# Patient Record
Sex: FEMALE | Race: WHITE | Employment: OTHER | URBAN - METROPOLITAN AREA
[De-identification: names, ages, dates, MRNs, and addresses within clinical notes are randomized per-mention and may not be internally consistent; named-entity substitution may affect disease eponyms.]

---

## 2020-01-27 ENCOUNTER — OFFICE VISIT (OUTPATIENT)
Dept: SURGERY | Age: 55
End: 2020-01-27

## 2020-01-27 VITALS
HEART RATE: 76 BPM | DIASTOLIC BLOOD PRESSURE: 63 MMHG | SYSTOLIC BLOOD PRESSURE: 91 MMHG | WEIGHT: 139 LBS | BODY MASS INDEX: 21.82 KG/M2 | HEIGHT: 67 IN

## 2020-01-27 DIAGNOSIS — T85.44XA CAPSULAR CONTRACTURE OF BREAST IMPLANT, INITIAL ENCOUNTER: Primary | ICD-10-CM

## 2020-01-27 RX ORDER — AMOXICILLIN 500 MG/1
TABLET, FILM COATED ORAL
COMMUNITY
End: 2020-03-06

## 2020-01-27 RX ORDER — CYCLOBENZAPRINE HCL 10 MG
TABLET ORAL
COMMUNITY

## 2020-01-27 RX ORDER — GABAPENTIN 100 MG/1
200 CAPSULE ORAL DAILY
COMMUNITY

## 2020-01-27 RX ORDER — FAMOTIDINE 20 MG/1
20 TABLET, FILM COATED ORAL 2 TIMES DAILY
COMMUNITY

## 2020-01-27 NOTE — PROGRESS NOTES
HISTORY OF PRESENT ILLNESS Roxanne Shay is a 47 y.o. female. HPI    NEW patient presents for consultation at the request of Puja Marin NP for possible RIGHT implant rupture. She says that she had her breast implants (silicone) placed in 4675, but ever since her mammogram about 15 year ago has had what she thinks is a leaking implant on the RIGHT. Says that this is painful and feels hard. Feels no lumps, has no nipple discharge/retraction or skin change. Also ha some pain on the LEFT off/on. Has chronic pain that she deals with daily. FH - Paternal aunt had breast cancer at age 61 and is . A paternal cousin had breast cancer at age 36 and is a survivor. Most recent mammogram was about 15 years ago. Review of Systems Constitutional: Negative. HENT: Positive for congestion. Eyes: Negative. Respiratory: Negative. Cardiovascular: Negative. Gastrointestinal: Positive for heartburn. Genitourinary: Negative. Musculoskeletal: Positive for back pain, joint pain and neck pain. Skin: Positive for rash. Neurological: Negative. Endo/Heme/Allergies: Negative. Psychiatric/Behavioral: Positive for depression. Physical Exam 
 
ASSESSMENT and PLAN 
{ASSESSMENT/PLAN:51608}

## 2020-01-27 NOTE — COMMUNICATION BODY
HISTORY OF PRESENT ILLNESS  Ying Mcgarry is a 47 y.o. female. HPI  NEW patient presents for consultation at the request of Raciel Topete NP for possible RIGHT implant rupture. She says that she had her breast implants (silicone) placed in , but ever since her mammogram about 15 year ago has had what she thinks is a leaking implant on the RIGHT. Says that this is painful and feels hard. Feels no lumps, has no nipple discharge/retraction or skin change. Also has some pain on the LEFT off/on. Has chronic pain that she deals with daily. FH - Paternal aunt had breast cancer at age 61 and is . A paternal cousin had breast cancer at age 36 and is a survivor.       Most recent mammogram was about 15 years ago.         Past Medical History:   Diagnosis Date    Alcohol abuse     Chronic pain     fibromyalgia    DJD (degenerative joint disease)     Fibromyalgia     Mitral prolapse     Psychiatric disorder 2006    admitted for depression    Tobacco abuse        Past Surgical History:   Procedure Laterality Date    APPENDECTOMY      HX BREAST AUGMENTATION      HX CATARACT REMOVAL      HX GYN      hysterectomy 20+ years    VAG HYST RADICAL         Social History     Socioeconomic History    Marital status:      Spouse name: Not on file    Number of children: Not on file    Years of education: Not on file    Highest education level: Not on file   Occupational History    Not on file   Social Needs    Financial resource strain: Not on file    Food insecurity:     Worry: Not on file     Inability: Not on file    Transportation needs:     Medical: Not on file     Non-medical: Not on file   Tobacco Use    Smoking status: Current Every Day Smoker     Packs/day: 1.00     Years: 30.00     Pack years: 30.00     Types: Cigarettes    Smokeless tobacco: Never Used   Substance and Sexual Activity    Alcohol use:  Yes     Alcohol/week: 21.0 standard drinks     Types: 21 Shots of liquor per week     Comment: social    Drug use: No    Sexual activity: Not on file   Lifestyle    Physical activity:     Days per week: Not on file     Minutes per session: Not on file    Stress: Not on file   Relationships    Social connections:     Talks on phone: Not on file     Gets together: Not on file     Attends Jainism service: Not on file     Active member of club or organization: Not on file     Attends meetings of clubs or organizations: Not on file     Relationship status: Not on file    Intimate partner violence:     Fear of current or ex partner: Not on file     Emotionally abused: Not on file     Physically abused: Not on file     Forced sexual activity: Not on file   Other Topics Concern    Not on file   Social History Narrative    Not on file       Current Outpatient Medications on File Prior to Visit   Medication Sig Dispense Refill    gabapentin (NEURONTIN) 100 mg capsule Take 200 mg by mouth daily.  cyclobenzaprine (FLEXERIL) 10 mg tablet Take  by mouth three (3) times daily as needed for Muscle Spasm(s).  prenatal vit/iron fum/folic ac (RIGHT STEP PRENATAL VITAMINS PO) Take  by mouth.  amoxicillin 500 mg tab Take  by mouth.  famotidine (PEPCID AC) 20 mg tablet Take 20 mg by mouth two (2) times a day.  cholecalciferol (VITAMIN D3) 1,000 unit tablet Take 2,000 Units by mouth daily.  nitroglycerin (NITROSTAT) 0.4 mg SL tablet 0.4 mg by SubLINGual route every five (5) minutes as needed for Chest Pain. Indications: ANGINA      paroxetine (PAXIL) 10 mg tablet Take 20 mg by mouth nightly.  [DISCONTINUED] aspirin 81 mg chewable tablet Take 81 mg by mouth daily.  [DISCONTINUED] ibuprofen (MOTRIN) 200 mg tablet Take 400 mg by mouth every three (3) hours. Indications: OSTEOARTHRITIS, PAIN      [DISCONTINUED] amoxicillin-clavulanate (AUGMENTIN) 875-125 mg per tablet Take 1 Tab by mouth two (2) times a day.  20 Tab 0    [DISCONTINUED] oxyCODONE-acetaminophen (PERCOCET) 5-325 mg per tablet Take 1 Tab by mouth every four (4) hours as needed for Pain. Max Daily Amount: 6 Tabs. 15 Tab 0     No current facility-administered medications on file prior to visit. Allergies   Allergen Reactions    Codeine Anaphylaxis     Hydrocodone/Oxycodone are okay per patient.  Egg Nausea and Vomiting       OB History    No obstetric history on file. Obstetric Comments   Menarche 15, LMP 12, # of children 2, age of 4st delivery 12, Hysterectomy/oophorectomy Yes/Yes, Breast bx No, history of breast feeding No, BCP No, Hormone therapy No               ROS  Constitutional: Negative. HENT: Positive for congestion. Eyes: Negative. Respiratory: Negative. Cardiovascular: Negative. Gastrointestinal: Positive for heartburn. Genitourinary: Negative. Musculoskeletal: Positive for back pain, joint pain and neck pain. Skin: Positive for rash. Neurological: Negative. Endo/Heme/Allergies: Negative. Psychiatric/Behavioral: Positive for depression. Physical Exam  Exam conducted with a chaperone present. Cardiovascular:      Rate and Rhythm: Normal rate and regular rhythm. Heart sounds: Normal heart sounds. Pulmonary:      Breath sounds: Normal breath sounds. Chest:      Breasts: Breasts are symmetrical.         Right: Normal. No swelling, bleeding, inverted nipple, mass, nipple discharge, skin change or tenderness. Left: Normal. No swelling, bleeding, inverted nipple, mass, nipple discharge, skin change or tenderness. Lymphadenopathy:      Cervical:      Right cervical: No superficial, deep or posterior cervical adenopathy. Left cervical: No superficial, deep or posterior cervical adenopathy. Upper Body:      Right upper body: No supraclavicular or axillary adenopathy. Left upper body: No supraclavicular or axillary adenopathy. BREAST ULTRASOUND  Indication: RIGHT breast capsular contracture  Technique:  The area was scanned using a high-frequency linear-array near-field transducer  Findings: No abnormal mass, lesion, or shadowing noted; no cysts; no axillary lymphadenopathy  Impression: Normal breast tissue  Disposition: No worrisome finding on ultrasound    ASSESSMENT and PLAN    ICD-10-CM ICD-9-CM    1. Capsular contracture of breast implant, initial encounter T85.44XA 611.83       Established patient presents for evaluation of possible RIGHT breast implant rupture, and is doing well overall. RIGHT breast implant with capsular contracture; pt states this has been present since she had a mammogram 15 years ago. Uncertain whether implant is ruptured on US. Advised follow up with plastic surgeon to discuss capsulectomy and implant replacement. However, as pt states that she is not interested in replacement, I can perform BL capsulectomy and implant removal. Will plan for surgery using existing incisions. Pt understands and consents to surgery, and would like to schedule for late March as she is expecting a grandchild in February. Will schedule surgery, and scheduling will f/u with the date. This plan was reviewed with the patient and patient agrees. All questions were answered.     Written by Cesar Lima, as dictated by Dr. David Martinez MD.

## 2020-01-27 NOTE — PROGRESS NOTES
HISTORY OF PRESENT ILLNESS  Sunshine Meredith is a 47 y.o. female. HPI  NEW patient presents for consultation at the request of Aakash Valenzuela NP for possible RIGHT implant rupture. She says that she had her breast implants (silicone) placed in 4960, but ever since her mammogram about 15 year ago has had what she thinks is a leaking implant on the RIGHT. Says that this is painful and feels hard. Feels no lumps, has no nipple discharge/retraction or skin change. Also has some pain on the LEFT off/on. Has chronic pain that she deals with daily. FH - Paternal aunt had breast cancer at age 61 and is . A paternal cousin had breast cancer at age 36 and is a survivor.       Most recent mammogram was about 15 years ago.         Past Medical History:   Diagnosis Date    Alcohol abuse     Chronic pain     fibromyalgia    DJD (degenerative joint disease)     Fibromyalgia     Mitral prolapse     Psychiatric disorder 2006    admitted for depression    Tobacco abuse        Past Surgical History:   Procedure Laterality Date    APPENDECTOMY      HX BREAST AUGMENTATION      HX CATARACT REMOVAL      HX GYN      hysterectomy 20+ years    VAG HYST RADICAL         Social History     Socioeconomic History    Marital status:      Spouse name: Not on file    Number of children: Not on file    Years of education: Not on file    Highest education level: Not on file   Occupational History    Not on file   Social Needs    Financial resource strain: Not on file    Food insecurity:     Worry: Not on file     Inability: Not on file    Transportation needs:     Medical: Not on file     Non-medical: Not on file   Tobacco Use    Smoking status: Current Every Day Smoker     Packs/day: 1.00     Years: 30.00     Pack years: 30.00     Types: Cigarettes    Smokeless tobacco: Never Used   Substance and Sexual Activity    Alcohol use:  Yes     Alcohol/week: 21.0 standard drinks     Types: 21 Shots of liquor per week     Comment: social    Drug use: No    Sexual activity: Not on file   Lifestyle    Physical activity:     Days per week: Not on file     Minutes per session: Not on file    Stress: Not on file   Relationships    Social connections:     Talks on phone: Not on file     Gets together: Not on file     Attends Buddhism service: Not on file     Active member of club or organization: Not on file     Attends meetings of clubs or organizations: Not on file     Relationship status: Not on file    Intimate partner violence:     Fear of current or ex partner: Not on file     Emotionally abused: Not on file     Physically abused: Not on file     Forced sexual activity: Not on file   Other Topics Concern    Not on file   Social History Narrative    Not on file       Current Outpatient Medications on File Prior to Visit   Medication Sig Dispense Refill    gabapentin (NEURONTIN) 100 mg capsule Take 200 mg by mouth daily.  cyclobenzaprine (FLEXERIL) 10 mg tablet Take  by mouth three (3) times daily as needed for Muscle Spasm(s).  prenatal vit/iron fum/folic ac (RIGHT STEP PRENATAL VITAMINS PO) Take  by mouth.  amoxicillin 500 mg tab Take  by mouth.  famotidine (PEPCID AC) 20 mg tablet Take 20 mg by mouth two (2) times a day.  cholecalciferol (VITAMIN D3) 1,000 unit tablet Take 2,000 Units by mouth daily.  nitroglycerin (NITROSTAT) 0.4 mg SL tablet 0.4 mg by SubLINGual route every five (5) minutes as needed for Chest Pain. Indications: ANGINA      paroxetine (PAXIL) 10 mg tablet Take 20 mg by mouth nightly.  [DISCONTINUED] aspirin 81 mg chewable tablet Take 81 mg by mouth daily.  [DISCONTINUED] ibuprofen (MOTRIN) 200 mg tablet Take 400 mg by mouth every three (3) hours. Indications: OSTEOARTHRITIS, PAIN      [DISCONTINUED] amoxicillin-clavulanate (AUGMENTIN) 875-125 mg per tablet Take 1 Tab by mouth two (2) times a day.  20 Tab 0    [DISCONTINUED] oxyCODONE-acetaminophen (PERCOCET) 5-325 mg per tablet Take 1 Tab by mouth every four (4) hours as needed for Pain. Max Daily Amount: 6 Tabs. 15 Tab 0     No current facility-administered medications on file prior to visit. Allergies   Allergen Reactions    Codeine Anaphylaxis     Hydrocodone/Oxycodone are okay per patient.  Egg Nausea and Vomiting       OB History    No obstetric history on file. Obstetric Comments   Menarche 15, LMP 12, # of children 2, age of 4st delivery 12, Hysterectomy/oophorectomy Yes/Yes, Breast bx No, history of breast feeding No, BCP No, Hormone therapy No               ROS  Constitutional: Negative. HENT: Positive for congestion. Eyes: Negative. Respiratory: Negative. Cardiovascular: Negative. Gastrointestinal: Positive for heartburn. Genitourinary: Negative. Musculoskeletal: Positive for back pain, joint pain and neck pain. Skin: Positive for rash. Neurological: Negative. Endo/Heme/Allergies: Negative. Psychiatric/Behavioral: Positive for depression. Physical Exam  Exam conducted with a chaperone present. Cardiovascular:      Rate and Rhythm: Normal rate and regular rhythm. Heart sounds: Normal heart sounds. Pulmonary:      Breath sounds: Normal breath sounds. Chest:      Breasts: Breasts are symmetrical.         Right: Normal. No swelling, bleeding, inverted nipple, mass, nipple discharge, skin change or tenderness. Left: Normal. No swelling, bleeding, inverted nipple, mass, nipple discharge, skin change or tenderness. Lymphadenopathy:      Cervical:      Right cervical: No superficial, deep or posterior cervical adenopathy. Left cervical: No superficial, deep or posterior cervical adenopathy. Upper Body:      Right upper body: No supraclavicular or axillary adenopathy. Left upper body: No supraclavicular or axillary adenopathy. BREAST ULTRASOUND  Indication: RIGHT breast capsular contracture  Technique:  The area was scanned using a high-frequency linear-array near-field transducer  Findings: No abnormal mass, lesion, or shadowing noted; no cysts; no axillary lymphadenopathy  Impression: Normal breast tissue  Disposition: No worrisome finding on ultrasound    ASSESSMENT and PLAN    ICD-10-CM ICD-9-CM    1. Capsular contracture of breast implant, initial encounter T85.44XA 611.83       Established patient presents for evaluation of possible RIGHT breast implant rupture, and is doing well overall. RIGHT breast implant with capsular contracture; pt states this has been present since she had a mammogram 15 years ago. Uncertain whether implant is ruptured on US. Advised follow up with plastic surgeon to discuss capsulectomy and implant replacement. However, as pt states that she is not interested in replacement, I can perform BL capsulectomy and implant removal. Will plan for surgery using existing incisions. Pt understands and consents to surgery, and would like to schedule for late March as she is expecting a grandchild in February. Will schedule surgery, and scheduling will f/u with the date. This plan was reviewed with the patient and patient agrees. All questions were answered.     Written by James Fraire, as dictated by Dr. Skyler Barros MD.

## 2020-01-27 NOTE — LETTER
2020 11:32 AM 
 
Patient:  Alcira Patel YOB: 1965 Date of Visit: 2020 Dear Claudene Piccolo: 
 
 
Thank you for referring Ms. Alcira Patel to me for evaluation/treatment. Below are the relevant portions of my assessment and plan of care. HISTORY OF PRESENT ILLNESS Alcira Patel is a 47 y.o. female. HPI 
NEW patient presents for consultation at the request of Oniel Lawson NP for possible RIGHT implant rupture. She says that she had her breast implants (silicone) placed in , but ever since her mammogram about 15 year ago has had what she thinks is a leaking implant on the RIGHT. Says that this is painful and feels hard. Feels no lumps, has no nipple discharge/retraction or skin change. Also has some pain on the LEFT off/on. Has chronic pain that she deals with daily. FH - Paternal aunt had breast cancer at age 61 and is . A paternal cousin had breast cancer at age 36 and is a survivor.   
  
Most recent mammogram was about 15 years ago.   
  
 
Past Medical History:  
Diagnosis Date  Alcohol abuse  Chronic pain   
 fibromyalgia  DJD (degenerative joint disease)  Fibromyalgia  Mitral prolapse  Psychiatric disorder   
 admitted for depression  Tobacco abuse Past Surgical History:  
Procedure Laterality Date  APPENDECTOMY  HX BREAST AUGMENTATION    
 HX CATARACT REMOVAL    
 HX GYN    
 hysterectomy 20+ years  VAG HYST RADICAL Social History Socioeconomic History  Marital status:  Spouse name: Not on file  Number of children: Not on file  Years of education: Not on file  Highest education level: Not on file Occupational History  Not on file Social Needs  Financial resource strain: Not on file  Food insecurity:  
  Worry: Not on file Inability: Not on file  Transportation needs:  
  Medical: Not on file Non-medical: Not on file Tobacco Use  
  Smoking status: Current Every Day Smoker Packs/day: 1.00 Years: 30.00 Pack years: 30.00 Types: Cigarettes  Smokeless tobacco: Never Used Substance and Sexual Activity  Alcohol use: Yes Alcohol/week: 21.0 standard drinks Types: 21 Shots of liquor per week Comment: social  
 Drug use: No  
 Sexual activity: Not on file Lifestyle  Physical activity:  
  Days per week: Not on file Minutes per session: Not on file  Stress: Not on file Relationships  Social connections:  
  Talks on phone: Not on file Gets together: Not on file Attends Bahai service: Not on file Active member of club or organization: Not on file Attends meetings of clubs or organizations: Not on file Relationship status: Not on file  Intimate partner violence:  
  Fear of current or ex partner: Not on file Emotionally abused: Not on file Physically abused: Not on file Forced sexual activity: Not on file Other Topics Concern  Not on file Social History Narrative  Not on file Current Outpatient Medications on File Prior to Visit Medication Sig Dispense Refill  gabapentin (NEURONTIN) 100 mg capsule Take 200 mg by mouth daily.  cyclobenzaprine (FLEXERIL) 10 mg tablet Take  by mouth three (3) times daily as needed for Muscle Spasm(s).  prenatal vit/iron fum/folic ac (RIGHT STEP PRENATAL VITAMINS PO) Take  by mouth.  amoxicillin 500 mg tab Take  by mouth.  famotidine (PEPCID AC) 20 mg tablet Take 20 mg by mouth two (2) times a day.  cholecalciferol (VITAMIN D3) 1,000 unit tablet Take 2,000 Units by mouth daily.  nitroglycerin (NITROSTAT) 0.4 mg SL tablet 0.4 mg by SubLINGual route every five (5) minutes as needed for Chest Pain. Indications: ANGINA  paroxetine (PAXIL) 10 mg tablet Take 20 mg by mouth nightly.  [DISCONTINUED] aspirin 81 mg chewable tablet Take 81 mg by mouth daily.  [DISCONTINUED] ibuprofen (MOTRIN) 200 mg tablet Take 400 mg by mouth every three (3) hours. Indications: OSTEOARTHRITIS, PAIN    
 [DISCONTINUED] amoxicillin-clavulanate (AUGMENTIN) 875-125 mg per tablet Take 1 Tab by mouth two (2) times a day. 20 Tab 0  [DISCONTINUED] oxyCODONE-acetaminophen (PERCOCET) 5-325 mg per tablet Take 1 Tab by mouth every four (4) hours as needed for Pain. Max Daily Amount: 6 Tabs. 15 Tab 0 No current facility-administered medications on file prior to visit. Allergies Allergen Reactions  Codeine Anaphylaxis Hydrocodone/Oxycodone are okay per patient.  Egg Nausea and Vomiting OB History No obstetric history on file. Obstetric Comments Menarche 15, LMP 1992, # of children 2, age of 4st delivery 12, Hysterectomy/oophorectomy Yes/Yes, Breast bx No, history of breast feeding No, BCP No, Hormone therapy No 
  
  
  
 
 
ROS Constitutional: Negative. HENT: Positive for congestion. Eyes: Negative. Respiratory: Negative. Cardiovascular: Negative. Gastrointestinal: Positive for heartburn. Genitourinary: Negative. Musculoskeletal: Positive for back pain, joint pain and neck pain. Skin: Positive for rash. Neurological: Negative. Endo/Heme/Allergies: Negative. Psychiatric/Behavioral: Positive for depression. Physical Exam 
Exam conducted with a chaperone present. Cardiovascular:  
   Rate and Rhythm: Normal rate and regular rhythm. Heart sounds: Normal heart sounds. Pulmonary:  
   Breath sounds: Normal breath sounds. Chest:  
   Breasts: Breasts are symmetrical.  
      Right: Normal. No swelling, bleeding, inverted nipple, mass, nipple discharge, skin change or tenderness. Left: Normal. No swelling, bleeding, inverted nipple, mass, nipple discharge, skin change or tenderness. Lymphadenopathy:  
   Cervical:  
   Right cervical: No superficial, deep or posterior cervical adenopathy. Left cervical: No superficial, deep or posterior cervical adenopathy. Upper Body:  
   Right upper body: No supraclavicular or axillary adenopathy. Left upper body: No supraclavicular or axillary adenopathy. BREAST ULTRASOUND Indication: RIGHT breast capsular contracture Technique: The area was scanned using a high-frequency linear-array near-field transducer Findings: No abnormal mass, lesion, or shadowing noted; no cysts; no axillary lymphadenopathy Impression: Normal breast tissue Disposition: No worrisome finding on ultrasound ASSESSMENT and PLAN 
  ICD-10-CM ICD-9-CM 1. Capsular contracture of breast implant, initial encounter T85.44XA 611.83 Established patient presents for evaluation of possible RIGHT breast implant rupture, and is doing well overall. RIGHT breast implant with capsular contracture; pt states this has been present since she had a mammogram 15 years ago. Uncertain whether implant is ruptured on US. Advised follow up with plastic surgeon to discuss capsulectomy and implant replacement. However, as pt states that she is not interested in replacement, I can perform BL capsulectomy and implant removal. Will plan for surgery using existing incisions. Pt understands and consents to surgery, and would like to schedule for late March as she is expecting a grandchild in February. Will schedule surgery, and scheduling will f/u with the date. This plan was reviewed with the patient and patient agrees. All questions were answered. Written by Heri Olvera, as dictated by Dr. Marty Latif MD. 
 
 
 
If you have questions, please do not hesitate to call me. I look forward to following Ms. Pawan Hammonds along with you. Sincerely, 
 
Ceasar Gramajo MD

## 2020-02-17 DIAGNOSIS — T85.44XA CAPSULAR CONTRACTURE OF BREAST IMPLANT, INITIAL ENCOUNTER: Primary | ICD-10-CM

## 2020-03-06 ENCOUNTER — HOSPITAL ENCOUNTER (OUTPATIENT)
Dept: PREADMISSION TESTING | Age: 55
Discharge: HOME | End: 2020-03-06
Payer: MEDICARE

## 2020-03-06 LAB
ANION GAP SERPL CALC-SCNC: 5 MMOL/L (ref 5–15)
BASOPHILS # BLD: 0.1 K/UL (ref 0–0.1)
BASOPHILS NFR BLD: 1 % (ref 0–1)
BUN SERPL-MCNC: 14 MG/DL (ref 6–20)
BUN/CREAT SERPL: 18 (ref 12–20)
CALCIUM SERPL-MCNC: 9.5 MG/DL (ref 8.5–10.1)
CHLORIDE SERPL-SCNC: 104 MMOL/L (ref 97–108)
CO2 SERPL-SCNC: 29 MMOL/L (ref 21–32)
CREAT SERPL-MCNC: 0.8 MG/DL (ref 0.55–1.02)
DIFFERENTIAL METHOD BLD: ABNORMAL
EOSINOPHIL # BLD: 0.3 K/UL (ref 0–0.4)
EOSINOPHIL NFR BLD: 5 % (ref 0–7)
ERYTHROCYTE [DISTWIDTH] IN BLOOD BY AUTOMATED COUNT: 12.6 % (ref 11.5–14.5)
GLUCOSE SERPL-MCNC: 105 MG/DL (ref 65–100)
HCT VFR BLD AUTO: 47.2 % (ref 35–47)
HGB BLD-MCNC: 16 G/DL (ref 11.5–16)
IMM GRANULOCYTES # BLD AUTO: 0.1 K/UL (ref 0–0.04)
IMM GRANULOCYTES NFR BLD AUTO: 1 % (ref 0–0.5)
LYMPHOCYTES # BLD: 1.9 K/UL (ref 0.8–3.5)
LYMPHOCYTES NFR BLD: 26 % (ref 12–49)
MCH RBC QN AUTO: 33.5 PG (ref 26–34)
MCHC RBC AUTO-ENTMCNC: 33.9 G/DL (ref 30–36.5)
MCV RBC AUTO: 98.7 FL (ref 80–99)
MONOCYTES # BLD: 0.6 K/UL (ref 0–1)
MONOCYTES NFR BLD: 8 % (ref 5–13)
NEUTS SEG # BLD: 4.3 K/UL (ref 1.8–8)
NEUTS SEG NFR BLD: 59 % (ref 32–75)
NRBC # BLD: 0 K/UL (ref 0–0.01)
NRBC BLD-RTO: 0 PER 100 WBC
PLATELET # BLD AUTO: 269 K/UL (ref 150–400)
PMV BLD AUTO: 8.7 FL (ref 8.9–12.9)
POTASSIUM SERPL-SCNC: 4.5 MMOL/L (ref 3.5–5.1)
RBC # BLD AUTO: 4.78 M/UL (ref 3.8–5.2)
SODIUM SERPL-SCNC: 138 MMOL/L (ref 136–145)
WBC # BLD AUTO: 7.3 K/UL (ref 3.6–11)

## 2020-03-06 PROCEDURE — 36415 COLL VENOUS BLD VENIPUNCTURE: CPT

## 2020-03-06 PROCEDURE — 80048 BASIC METABOLIC PNL TOTAL CA: CPT

## 2020-03-06 PROCEDURE — 93005 ELECTROCARDIOGRAM TRACING: CPT

## 2020-03-06 PROCEDURE — 85025 COMPLETE CBC W/AUTO DIFF WBC: CPT

## 2020-03-06 NOTE — PERIOP NOTES
[address] [address] [facility]   MAIN OR                                  [phone]   MAIN PRE OP                          [phone]                                                                                AMBULATORY PRE OP          [phone]  PRE-ADMISSION TESTING    [phone]   Surgery Date:   3-17-20       Is surgery arrival time given by surgeon? [id number]}  If [facility] staff will call you between 3 and 7pm the day before your surgery with your arrival time. (If your surgery is on a Monday, we will call you the Friday before.)    Call [phone] after 7pm Monday-Friday if you did not receive this call. INSTRUCTIONS BEFORE YOUR SURGERY   When You  Arrive Arrive at the [address] [facility] on the day of your surgery  Have your insurance card, photo ID, and any copayment (if needed)   Food   and   Drink NO food or drink after midnight the night before surgery    This means NO water, gum, mints, coffee, juice, etc.  No alcohol (beer, wine, liquor) 24 hours before and after surgery   Medications to   TAKE   Morning of Surgery MEDICATIONS TO TAKE THE MORNING OF SURGERY WITH A SIP OF WATER:    famotidine   Medications  To  STOP      7 days before surgery  Non-Steroidal anti-inflammatory Drugs (NSAID's): for example, Ibuprofen (Advil, Motrin), Naproxen (Aleve)   Aspirin, if taking for pain    Herbal supplements, vitamins, and fish oil   Other:  (Pain medications not listed above, including Tylenol may be taken)   Blood  Thinners  If you take  Aspirin, Plavix, Coumadin, or any blood-thinning or anti-blood clot medicine, talk to the doctor who prescribed the medications for pre-operative instructions.    Bathing Clothing  Jewelry  Valuables      If you shower the morning of surgery, please do not apply anything to your skin (lotions, powders, deodorant, or makeup, especially mascara)   Follow Chlorhexidine Care Fusion body wash instructions provided to you during PAT appointment. Begin 3 days prior to surgery.  Do not shave or trim anywhere 24 hours before surgery   Wear your hair loose or down; no pony-tails, buns, or metal hair clips   Wear loose, comfortable, clean clothes   Wear glasses instead of contacts   Leave money, valuables, and jewelry, including body piercings, at home   Going Home - or Spending the Night  SAME-DAY SURGERY: You must have a responsible adult drive you home and stay with you 24 hours after surgery   ADMITS: If your doctor is keeping you in the hospital after surgery, leave personal belongings/luggage in your car until you have a hospital room number. Hospital discharge time is 12 noon  Drivers must be here before 12 noon unless you are told differently   Special Instructions      Follow all instructions so your surgery wont be cancelled. Please, be on time. If a situation occurs and you are delayed the day of surgery, call [phone] or [phone]. If your physical condition changes (like a fever, cold, flu, etc.) call your surgeon. Home medication(s) reviewed and verified   during PAT appointment. The patient was contacted  in person. The patient verbalizes understanding of all instructions and does not  need reinforcement.

## 2020-03-08 LAB
ATRIAL RATE: 85 BPM
CALCULATED P AXIS, ECG09: 75 DEGREES
CALCULATED R AXIS, ECG10: 49 DEGREES
CALCULATED T AXIS, ECG11: 50 DEGREES
DIAGNOSIS, 93000: NORMAL
P-R INTERVAL, ECG05: 148 MS
Q-T INTERVAL, ECG07: 382 MS
QRS DURATION, ECG06: 74 MS
QTC CALCULATION (BEZET), ECG08: 454 MS
VENTRICULAR RATE, ECG03: 85 BPM

## 2020-03-09 NOTE — PERIOP NOTES
Discussed patient with Candance Gardener NP. We will follow up with PCP after patient visit this week.

## 2020-03-10 NOTE — PERIOP NOTES
Call placed to MIKE Freedman NP's office, patient does not have an appointment scheduled for upcoming week.       Message left for patient concerning upcoming appointment with Krista Smith NP.

## 2020-03-11 NOTE — PERIOP NOTES
Spoke with Dr. 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 Negative workup at that time. No followup since 4.2009. No further testing requested at this time.

## 2020-03-11 NOTE — PERIOP NOTES
Spoke with patient regarding follow up appt  With PCP. Patient did not keep the appt for this week Discussed with patient her UGI on 1/19. States was told to FU with her PCP. Called PCP Sindhu Breen NP for last OVN.

## 2020-03-16 ENCOUNTER — ANESTHESIA EVENT (OUTPATIENT)
Dept: SURGERY | Age: 55
End: 2020-03-16
Payer: MEDICARE

## 2020-03-17 ENCOUNTER — ANESTHESIA (OUTPATIENT)
Dept: SURGERY | Age: 55
End: 2020-03-17
Payer: MEDICARE

## 2020-03-17 ENCOUNTER — HOSPITAL ENCOUNTER (OUTPATIENT)
Age: 55
Setting detail: OUTPATIENT SURGERY
Discharge: HOME | End: 2020-03-17
Attending: SURGERY | Admitting: SURGERY
Payer: MEDICARE

## 2020-03-17 ENCOUNTER — APPOINTMENT (OUTPATIENT)
Dept: GENERAL RADIOLOGY | Age: 55
End: 2020-03-17
Attending: SURGERY
Payer: MEDICARE

## 2020-03-17 VITALS
SYSTOLIC BLOOD PRESSURE: 99 MMHG | RESPIRATION RATE: 16 BRPM | DIASTOLIC BLOOD PRESSURE: 68 MMHG | OXYGEN SATURATION: 95 % | TEMPERATURE: 97.6 F | HEIGHT: 66 IN | WEIGHT: 137.79 LBS | HEART RATE: 90 BPM | BODY MASS INDEX: 22.14 KG/M2

## 2020-03-17 DIAGNOSIS — T85.44XA CAPSULAR CONTRACTURE OF BREAST IMPLANT, INITIAL ENCOUNTER: ICD-10-CM

## 2020-03-17 PROCEDURE — 77030011267 HC ELECTRD BLD COVD -A: Performed by: SURGERY

## 2020-03-17 PROCEDURE — 74011250636 HC RX REV CODE- 250/636: Performed by: ANESTHESIOLOGY

## 2020-03-17 PROCEDURE — 77030002996 HC SUT SLK J&J -A: Performed by: SURGERY

## 2020-03-17 PROCEDURE — 74011000250 HC RX REV CODE- 250: Performed by: NURSE ANESTHETIST, CERTIFIED REGISTERED

## 2020-03-17 PROCEDURE — 74011000250 HC RX REV CODE- 250: Performed by: SURGERY

## 2020-03-17 PROCEDURE — 77030031139 HC SUT VCRL2 J&J -A: Performed by: SURGERY

## 2020-03-17 PROCEDURE — 88300 SURGICAL PATH GROSS: CPT

## 2020-03-17 PROCEDURE — 74011250636 HC RX REV CODE- 250/636: Performed by: NURSE ANESTHETIST, CERTIFIED REGISTERED

## 2020-03-17 PROCEDURE — 77030027744 HC PWDR HEMSTAT ARISTA ABSRB 5GM BARD -D: Performed by: SURGERY

## 2020-03-17 PROCEDURE — 77030040506 HC DRN WND MDII -A: Performed by: SURGERY

## 2020-03-17 PROCEDURE — C9290 INJ, BUPIVACAINE LIPOSOME: HCPCS | Performed by: SURGERY

## 2020-03-17 PROCEDURE — 77030010512 HC APPL CLP LIG J&J -C: Performed by: SURGERY

## 2020-03-17 PROCEDURE — 74011000258 HC RX REV CODE- 258: Performed by: SURGERY

## 2020-03-17 PROCEDURE — 76210000035 HC AMBSU PH I REC 1 TO 1.5 HR: Performed by: SURGERY

## 2020-03-17 PROCEDURE — 74011250636 HC RX REV CODE- 250/636: Performed by: SURGERY

## 2020-03-17 PROCEDURE — 88305 TISSUE EXAM BY PATHOLOGIST: CPT

## 2020-03-17 PROCEDURE — 77030005537 HC CATH URETH BARD -A: Performed by: SURGERY

## 2020-03-17 PROCEDURE — 77030002933 HC SUT MCRYL J&J -A: Performed by: SURGERY

## 2020-03-17 PROCEDURE — 77030018836 HC SOL IRR NACL ICUM -A: Performed by: SURGERY

## 2020-03-17 PROCEDURE — 76030000004 HC AMB SURG OR TIME 2 TO 2.5: Performed by: SURGERY

## 2020-03-17 PROCEDURE — 76210000046 HC AMBSU PH II REC FIRST 0.5 HR: Performed by: SURGERY

## 2020-03-17 PROCEDURE — 71045 X-RAY EXAM CHEST 1 VIEW: CPT

## 2020-03-17 PROCEDURE — 76060000064 HC AMB SURG ANES 2 TO 2.5 HR: Performed by: SURGERY

## 2020-03-17 PROCEDURE — 77030020143 HC AIRWY LARYN INTUB CGAS -A: Performed by: ANESTHESIOLOGY

## 2020-03-17 RX ORDER — PHENYLEPHRINE HYDROCHLORIDE 10 MG/ML
INJECTION INTRAVENOUS AS NEEDED
Status: DISCONTINUED | OUTPATIENT
Start: 2020-03-17 | End: 2020-03-17 | Stop reason: HOSPADM

## 2020-03-17 RX ORDER — DEXAMETHASONE SODIUM PHOSPHATE 4 MG/ML
INJECTION, SOLUTION INTRA-ARTICULAR; INTRALESIONAL; INTRAMUSCULAR; INTRAVENOUS; SOFT TISSUE AS NEEDED
Status: DISCONTINUED | OUTPATIENT
Start: 2020-03-17 | End: 2020-03-17 | Stop reason: HOSPADM

## 2020-03-17 RX ORDER — PROPOFOL 10 MG/ML
INJECTION, EMULSION INTRAVENOUS AS NEEDED
Status: DISCONTINUED | OUTPATIENT
Start: 2020-03-17 | End: 2020-03-17 | Stop reason: HOSPADM

## 2020-03-17 RX ORDER — BUPIVACAINE HYDROCHLORIDE AND EPINEPHRINE 5; 5 MG/ML; UG/ML
30 INJECTION, SOLUTION EPIDURAL; INTRACAUDAL; PERINEURAL ONCE
Status: DISCONTINUED | OUTPATIENT
Start: 2020-03-17 | End: 2020-03-17 | Stop reason: HOSPADM

## 2020-03-17 RX ORDER — LIDOCAINE HYDROCHLORIDE 10 MG/ML
0.1 INJECTION, SOLUTION EPIDURAL; INFILTRATION; INTRACAUDAL; PERINEURAL AS NEEDED
Status: DISCONTINUED | OUTPATIENT
Start: 2020-03-17 | End: 2020-03-17 | Stop reason: HOSPADM

## 2020-03-17 RX ORDER — KETOROLAC TROMETHAMINE 30 MG/ML
INJECTION, SOLUTION INTRAMUSCULAR; INTRAVENOUS AS NEEDED
Status: DISCONTINUED | OUTPATIENT
Start: 2020-03-17 | End: 2020-03-17 | Stop reason: HOSPADM

## 2020-03-17 RX ORDER — MIDAZOLAM HYDROCHLORIDE 1 MG/ML
INJECTION, SOLUTION INTRAMUSCULAR; INTRAVENOUS AS NEEDED
Status: DISCONTINUED | OUTPATIENT
Start: 2020-03-17 | End: 2020-03-17 | Stop reason: HOSPADM

## 2020-03-17 RX ORDER — FENTANYL CITRATE 50 UG/ML
INJECTION, SOLUTION INTRAMUSCULAR; INTRAVENOUS AS NEEDED
Status: DISCONTINUED | OUTPATIENT
Start: 2020-03-17 | End: 2020-03-17 | Stop reason: HOSPADM

## 2020-03-17 RX ORDER — NALOXONE HYDROCHLORIDE 0.4 MG/ML
0.04 INJECTION, SOLUTION INTRAMUSCULAR; INTRAVENOUS; SUBCUTANEOUS
Status: DISCONTINUED | OUTPATIENT
Start: 2020-03-17 | End: 2020-03-17 | Stop reason: HOSPADM

## 2020-03-17 RX ORDER — DIPHENHYDRAMINE HYDROCHLORIDE 50 MG/ML
12.5 INJECTION, SOLUTION INTRAMUSCULAR; INTRAVENOUS AS NEEDED
Status: DISCONTINUED | OUTPATIENT
Start: 2020-03-17 | End: 2020-03-17 | Stop reason: HOSPADM

## 2020-03-17 RX ORDER — ONDANSETRON 2 MG/ML
INJECTION INTRAMUSCULAR; INTRAVENOUS AS NEEDED
Status: DISCONTINUED | OUTPATIENT
Start: 2020-03-17 | End: 2020-03-17 | Stop reason: HOSPADM

## 2020-03-17 RX ORDER — HYDROCODONE BITARTRATE AND ACETAMINOPHEN 5; 325 MG/1; MG/1
1 TABLET ORAL ONCE
Status: DISCONTINUED | OUTPATIENT
Start: 2020-03-17 | End: 2020-03-17 | Stop reason: HOSPADM

## 2020-03-17 RX ORDER — SODIUM CHLORIDE 0.9 % (FLUSH) 0.9 %
5-40 SYRINGE (ML) INJECTION AS NEEDED
Status: DISCONTINUED | OUTPATIENT
Start: 2020-03-17 | End: 2020-03-18 | Stop reason: HOSPADM

## 2020-03-17 RX ORDER — SODIUM CHLORIDE 0.9 % (FLUSH) 0.9 %
5-40 SYRINGE (ML) INJECTION EVERY 8 HOURS
Status: DISCONTINUED | OUTPATIENT
Start: 2020-03-17 | End: 2020-03-17 | Stop reason: HOSPADM

## 2020-03-17 RX ORDER — SODIUM CHLORIDE 0.9 % (FLUSH) 0.9 %
5-40 SYRINGE (ML) INJECTION AS NEEDED
Status: DISCONTINUED | OUTPATIENT
Start: 2020-03-17 | End: 2020-03-17 | Stop reason: HOSPADM

## 2020-03-17 RX ORDER — SODIUM CHLORIDE, SODIUM LACTATE, POTASSIUM CHLORIDE, CALCIUM CHLORIDE 600; 310; 30; 20 MG/100ML; MG/100ML; MG/100ML; MG/100ML
125 INJECTION, SOLUTION INTRAVENOUS CONTINUOUS
Status: DISCONTINUED | OUTPATIENT
Start: 2020-03-17 | End: 2020-03-18 | Stop reason: HOSPADM

## 2020-03-17 RX ORDER — FLUMAZENIL 0.1 MG/ML
0.2 INJECTION INTRAVENOUS
Status: DISCONTINUED | OUTPATIENT
Start: 2020-03-17 | End: 2020-03-17 | Stop reason: HOSPADM

## 2020-03-17 RX ORDER — SODIUM CHLORIDE 0.9 % (FLUSH) 0.9 %
5-40 SYRINGE (ML) INJECTION EVERY 8 HOURS
Status: DISCONTINUED | OUTPATIENT
Start: 2020-03-17 | End: 2020-03-18 | Stop reason: HOSPADM

## 2020-03-17 RX ORDER — HYDROCODONE BITARTRATE AND ACETAMINOPHEN 7.5; 325 MG/1; MG/1
1 TABLET ORAL
Qty: 25 TAB | Refills: 0 | Status: SHIPPED | OUTPATIENT
Start: 2020-03-17 | End: 2020-03-24

## 2020-03-17 RX ORDER — ALBUTEROL SULFATE 0.83 MG/ML
2.5 SOLUTION RESPIRATORY (INHALATION) AS NEEDED
Status: DISCONTINUED | OUTPATIENT
Start: 2020-03-17 | End: 2020-03-18 | Stop reason: HOSPADM

## 2020-03-17 RX ORDER — HYDROMORPHONE HYDROCHLORIDE 1 MG/ML
.25-1 INJECTION, SOLUTION INTRAMUSCULAR; INTRAVENOUS; SUBCUTANEOUS
Status: DISCONTINUED | OUTPATIENT
Start: 2020-03-17 | End: 2020-03-18 | Stop reason: HOSPADM

## 2020-03-17 RX ORDER — FENTANYL CITRATE 50 UG/ML
25 INJECTION, SOLUTION INTRAMUSCULAR; INTRAVENOUS
Status: DISCONTINUED | OUTPATIENT
Start: 2020-03-17 | End: 2020-03-18 | Stop reason: HOSPADM

## 2020-03-17 RX ORDER — LIDOCAINE HYDROCHLORIDE 20 MG/ML
INJECTION, SOLUTION EPIDURAL; INFILTRATION; INTRACAUDAL; PERINEURAL AS NEEDED
Status: DISCONTINUED | OUTPATIENT
Start: 2020-03-17 | End: 2020-03-17 | Stop reason: HOSPADM

## 2020-03-17 RX ORDER — SODIUM CHLORIDE, SODIUM LACTATE, POTASSIUM CHLORIDE, CALCIUM CHLORIDE 600; 310; 30; 20 MG/100ML; MG/100ML; MG/100ML; MG/100ML
125 INJECTION, SOLUTION INTRAVENOUS CONTINUOUS
Status: DISCONTINUED | OUTPATIENT
Start: 2020-03-17 | End: 2020-03-17 | Stop reason: HOSPADM

## 2020-03-17 RX ORDER — ONDANSETRON 2 MG/ML
4 INJECTION INTRAMUSCULAR; INTRAVENOUS AS NEEDED
Status: DISCONTINUED | OUTPATIENT
Start: 2020-03-17 | End: 2020-03-18 | Stop reason: HOSPADM

## 2020-03-17 RX ADMIN — FENTANYL CITRATE 25 MCG: 50 INJECTION, SOLUTION INTRAMUSCULAR; INTRAVENOUS at 08:39

## 2020-03-17 RX ADMIN — HYDROMORPHONE HYDROCHLORIDE 0.5 MG: 1 INJECTION, SOLUTION INTRAMUSCULAR; INTRAVENOUS; SUBCUTANEOUS at 10:29

## 2020-03-17 RX ADMIN — HYDROMORPHONE HYDROCHLORIDE 0.5 MG: 1 INJECTION, SOLUTION INTRAMUSCULAR; INTRAVENOUS; SUBCUTANEOUS at 10:12

## 2020-03-17 RX ADMIN — PHENYLEPHRINE HYDROCHLORIDE 100 MCG: 10 INJECTION INTRAVENOUS at 08:22

## 2020-03-17 RX ADMIN — PHENYLEPHRINE HYDROCHLORIDE 100 MCG: 10 INJECTION INTRAVENOUS at 08:09

## 2020-03-17 RX ADMIN — KETOROLAC TROMETHAMINE 30 MG: 30 INJECTION INTRAMUSCULAR; INTRAVENOUS at 09:19

## 2020-03-17 RX ADMIN — PROPOFOL 140 MG: 10 INJECTION, EMULSION INTRAVENOUS at 07:39

## 2020-03-17 RX ADMIN — HYDROMORPHONE HYDROCHLORIDE 0.5 MG: 1 INJECTION, SOLUTION INTRAMUSCULAR; INTRAVENOUS; SUBCUTANEOUS at 09:46

## 2020-03-17 RX ADMIN — SODIUM CHLORIDE, SODIUM LACTATE, POTASSIUM CHLORIDE, AND CALCIUM CHLORIDE 125 ML/HR: 600; 310; 30; 20 INJECTION, SOLUTION INTRAVENOUS at 06:52

## 2020-03-17 RX ADMIN — PHENYLEPHRINE HYDROCHLORIDE 100 MCG: 10 INJECTION INTRAVENOUS at 07:53

## 2020-03-17 RX ADMIN — CEFAZOLIN SODIUM 2 G: 1 INJECTION, POWDER, FOR SOLUTION INTRAMUSCULAR; INTRAVENOUS at 07:45

## 2020-03-17 RX ADMIN — HYDROMORPHONE HYDROCHLORIDE 0.5 MG: 1 INJECTION, SOLUTION INTRAMUSCULAR; INTRAVENOUS; SUBCUTANEOUS at 09:58

## 2020-03-17 RX ADMIN — PHENYLEPHRINE HYDROCHLORIDE 100 MCG: 10 INJECTION INTRAVENOUS at 08:33

## 2020-03-17 RX ADMIN — PHENYLEPHRINE HYDROCHLORIDE 100 MCG: 10 INJECTION INTRAVENOUS at 09:00

## 2020-03-17 RX ADMIN — MIDAZOLAM HYDROCHLORIDE 2 MG: 2 INJECTION, SOLUTION INTRAMUSCULAR; INTRAVENOUS at 07:35

## 2020-03-17 RX ADMIN — DEXAMETHASONE SODIUM PHOSPHATE 4 MG: 4 INJECTION, SOLUTION INTRAMUSCULAR; INTRAVENOUS at 07:47

## 2020-03-17 RX ADMIN — ONDANSETRON 4 MG: 2 INJECTION INTRAMUSCULAR; INTRAVENOUS at 09:07

## 2020-03-17 RX ADMIN — FENTANYL CITRATE 25 MCG: 50 INJECTION, SOLUTION INTRAMUSCULAR; INTRAVENOUS at 08:00

## 2020-03-17 RX ADMIN — LIDOCAINE HYDROCHLORIDE 60 MG: 20 INJECTION, SOLUTION INTRAVENOUS at 07:39

## 2020-03-17 RX ADMIN — FENTANYL CITRATE 25 MCG: 50 INJECTION, SOLUTION INTRAMUSCULAR; INTRAVENOUS at 07:46

## 2020-03-17 NOTE — H&P
HISTORY OF PRESENT ILLNESS  Kanika Client is a 47 y.o. female. HPI  NEW patient presents for consultation at the request Alejo Garcia NP for possible RIGHT implant rupture.  She says that she had her breast implants (silicone) placed in , but ever since her mammogram about 15 year ago has had what she thinks is a leaking implant on the RIGHT.  Says that this is painful and feels hard.  Feels no lumps, has no nipple discharge/retraction or skin change.  Also has some pain on the LEFT off/on.  Has chronic pain that she deals with daily.       FH - Paternal aunt had breast cancer at age 61 and is .  A paternal cousin had breast cancer at age 36 and is a survivor.       Most recent mammogram was about 13 years ago.               Past Medical History:   Diagnosis Date    Alcohol abuse      Chronic pain       fibromyalgia    DJD (degenerative joint disease)      Fibromyalgia      Mitral prolapse      Psychiatric disorder 2006     admitted for depression    Tobacco abuse                 Past Surgical History:   Procedure Laterality Date    APPENDECTOMY        HX BREAST AUGMENTATION       HX CATARACT REMOVAL        HX GYN         hysterectomy 20+ years    VAG HYST RADICAL             Social History            Socioeconomic History    Marital status:        Spouse name: Not on file    Number of children: Not on file    Years of education: Not on file    Highest education level: Not on file   Occupational History    Not on file   Social Needs    Financial resource strain: Not on file    Food insecurity:       Worry: Not on file       Inability: Not on file    Transportation needs:       Medical: Not on file       Non-medical: Not on file   Tobacco Use    Smoking status: Current Every Day Smoker       Packs/day: 1.00       Years: 30.00       Pack years: 30.00       Types: Cigarettes    Smokeless tobacco: Never Used   Substance and Sexual Activity    Alcohol use:  Yes       Alcohol/week: 21.0 standard drinks       Types: 21 Shots of liquor per week       Comment: social    Drug use: No    Sexual activity: Not on file   Lifestyle    Physical activity:       Days per week: Not on file       Minutes per session: Not on file    Stress: Not on file   Relationships    Social connections:       Talks on phone: Not on file       Gets together: Not on file       Attends [employer] service: Not on file       Active member of club or organization: Not on file       Attends meetings of clubs or organizations: Not on file       Relationship status: Not on file    Intimate partner violence:       Fear of current or ex partner: Not on file       Emotionally abused: Not on file       Physically abused: Not on file       Forced sexual activity: Not on file   Other Topics Concern    Not on file   Social History Narrative    Not on file                Current Outpatient Medications on File Prior to Visit   Medication Sig Dispense Refill    gabapentin (NEURONTIN) 100 mg capsule Take 200 mg by mouth daily.        cyclobenzaprine (FLEXERIL) 10 mg tablet Take  by mouth three (3) times daily as needed for Muscle Spasm(s).        prenatal vit/iron fum/folic ac (RIGHT STEP PRENATAL VITAMINS PO) Take  by mouth.        amoxicillin 500 mg tab Take  by mouth.        famotidine (PEPCID AC) 20 mg tablet Take 20 mg by mouth two (2) times a day.        cholecalciferol (VITAMIN D3) 1,000 unit tablet Take 2,000 Units by mouth daily.        nitroglycerin (NITROSTAT) 0.4 mg SL tablet 0.4 mg by SubLINGual route every five (5) minutes as needed for Chest Pain. Indications: ANGINA        paroxetine (PAXIL) 10 mg tablet Take 20 mg by mouth nightly.        [DISCONTINUED] aspirin 81 mg chewable tablet Take 81 mg by mouth daily.        [DISCONTINUED] ibuprofen (MOTRIN) 200 mg tablet Take 400 mg by mouth every three (3) hours.  Indications: OSTEOARTHRITIS, PAIN        [DISCONTINUED] amoxicillin-clavulanate (AUGMENTIN) 875-125 mg per tablet Take 1 Tab by mouth two (2) times a day. 20 Tab 0    [DISCONTINUED] oxyCODONE-acetaminophen (PERCOCET) 5-325 mg per tablet Take 1 Tab by mouth every four (4) hours as needed for Pain. Max Daily Amount: 6 Tabs. 15 Tab 0      No current facility-administered medications on file prior to visit.                Allergies   Allergen Reactions    Codeine Anaphylaxis       Hydrocodone/Oxycodone are okay per patient.  Egg Nausea and Vomiting         OB History    No obstetric history on file.       Obstetric Comments   Menarche 15, LMP 1992, # of children 2, age of 4st delivery 12, Hysterectomy/oophorectomy Yes/Yes, Breast bx No, history of breast feeding No, BCP No, Hormone therapy No                   ROS  Constitutional: Negative.    HENT: Positive for congestion.    Eyes: Negative.    Respiratory: Negative.    Cardiovascular: Negative.    Gastrointestinal: Positive for heartburn. Genitourinary: Negative.    Musculoskeletal: Positive for back pain, joint pain and neck pain. Skin: Positive for rash. Neurological: Negative.    Endo/Heme/Allergies: Negative.    Psychiatric/Behavioral: Positive for depression.         Physical Exam  Exam conducted with a chaperone present. Cardiovascular:      Rate and Rhythm: Normal rate and regular rhythm. Heart sounds: Normal heart sounds. Pulmonary:      Breath sounds: Normal breath sounds. Chest:      Breasts: Breasts are symmetrical.         Right: Normal. No swelling, bleeding, inverted nipple, mass, nipple discharge, skin change or tenderness. Left: Normal. No swelling, bleeding, inverted nipple, mass, nipple discharge, skin change or tenderness. Lymphadenopathy:      Cervical:      Right cervical: No superficial, deep or posterior cervical adenopathy. Left cervical: No superficial, deep or posterior cervical adenopathy. Upper Body:      Right upper body: No supraclavicular or axillary adenopathy. Left upper body: No supraclavicular or axillary adenopathy.       BREAST ULTRASOUND  Indication: RIGHT breast capsular contracture  Technique: The area was scanned using a high-frequency linear-array near-field transducer  Findings: No abnormal mass, lesion, or shadowing noted; no cysts; no axillary lymphadenopathy  Impression: Normal breast tissue  Disposition: No worrisome finding on ultrasound     ASSESSMENT and PLAN      ICD-10-CM ICD-9-CM     1. Capsular contracture of breast implant, initial encounter T85.44XA 611.83        Established patient presents for evaluation of possible RIGHT breast implant rupture, and is doing well overall. RIGHT breast implant with capsular contracture; pt states this has been present since she had a mammogram 15 years ago. Uncertain whether implant is ruptured on US. Advised follow up with plastic surgeon to discuss capsulectomy and implant replacement. However, as pt states that she is not interested in replacement, I can perform BL capsulectomy and implant removal. Will plan for surgery using existing incisions. Pt understands and consents to surgery, and would like to schedule for late March as she is expecting a grandchild in February. Will schedule surgery, and scheduling will f/u with the date. This plan was reviewed with the patient and patient agrees.  All questions were answered.

## 2020-03-17 NOTE — ANESTHESIA POSTPROCEDURE EVALUATION
Procedure(s):  BILATERAL BREAST IMPLANT REMOVAL, BILATERAL BREAST CAPSULECTOMY. general    Anesthesia Post Evaluation      Multimodal analgesia: multimodal analgesia not used between 6 hours prior to anesthesia start to PACU discharge  Patient location during evaluation: PACU  Patient participation: complete - patient participated  Level of consciousness: awake  Pain management: adequate  Airway patency: patent  Anesthetic complications: no  Cardiovascular status: acceptable, blood pressure returned to baseline and hemodynamically stable  Respiratory status: acceptable  Hydration status: acceptable  Post anesthesia nausea and vomiting:  controlled      Vitals Value Taken Time   BP 98/65 3/17/2020 11:00 AM   Temp 36.5 °C (97.7 °F) 3/17/2020  9:43 AM   Pulse 91 3/17/2020 11:00 AM   Resp 13 3/17/2020 11:00 AM   SpO2 91 % 3/17/2020 11:00 AM   Vitals shown include unvalidated device data.

## 2020-03-17 NOTE — DISCHARGE INSTRUCTIONS
Discharge Instructions from Dr. Shalini Slade    · I will call you with the pathology results, typically within 1 week from today. · You may shower, but no hot tubs, swimming pools, or baths until your incision is healed. · No heavy lifting with the affected extremity (nothing greater than 5 pounds), and limit its use for the next 4-5 days. · You may use an ice pack for comfort for the next couple of days, but do not place ice directly on the skin. Rather, use a towel or clothing to serve as a barrier between skin and ice to prevent injury. · If I placed a drain, follow the drain instructions provided, especially as you keep a record of the drain output. · Follow medication instructions carefully. · Watch for signs of infection as listed below. · Redness  · Swelling  · Drainage from the incision or from your nipple that appears infected  · Fever over 101 degrees for consecutive readings, or over 99.5 if you are currently undergoing chemotherapy. · Call our office (number is below) for a follow-up appointment. · If you have any problems, our phone number is [phone]. DISCHARGE SUMMARY from your Nurse      PATIENT INSTRUCTIONS    After general anesthesia or intravenous sedation, for 24 hours or while taking prescription Narcotics:  · Limit your activities  · Do not drive and operate hazardous machinery  · Do not make important personal or business decisions  · Do  not drink alcoholic beverages  · If you have not urinated within 8 hours after discharge, please contact your surgeon on call.     Report the following to your surgeon:  · Excessive pain, swelling, redness or odor of or around the surgical area  · Temperature over 100.5  · Nausea and vomiting lasting longer than 4 hours or if unable to take medications  · Any signs of decreased circulation or nerve impairment to extremity: change in color, persistent  numbness, tingling, coldness or increase pain  · Any questions      GOOD HELP TO FIGHT AN INFECTION  Here are a few tip to help reduce the chance of getting an infection after surgery:   Wash Your Hands   Good handwashing is the most important thing you and your caregiver can do.  Wash before and after caring for any wounds. Dry your hand with a clean towel.  Wash with soap and water for at least 20 seconds. A TIP: sing the \"Happy Birthday\" song through one time while washing to help with the timing.  Use a hand  in between washings.  Shower   When your surgeon says it is OK to take a shower, use a new bar of antibacterial soap (if that is what you use, and keep that bar of soap ONLY for your use), or antibacterial body wash.  Use a clean wash cloth or sponge when you bathe.  Dry off with a clean towel  after every bath - be careful around any wounds, skin staples, sutures or surgical glue over/on wounds.  Do not enter swimming pools, hot tubs, lakes, rivers and/or ocean until wounds are healed and your doctor/surgeon says it is OK.  Use Clean Sheets   Sleep on freshly laundered sheets after your surgery.  Keep the surgery site covered with a clean, dry bandage (if instructed to do so). If the bandage becomes soiled, reapply a new, dry, clean bandage.  Do not allow pets to sleep with you while your wound is healing.  Lifestyle Modification and Controlling Your Blood Sugar   Smoking slows wound healing. Stop smoking and limit exposure to second-hand smoke.  High blood sugar slows wound healing. Eat a well-balanced diet to provide proper nutrition while healing   Monitor your blood sugar (if you are a diabetic) and take your medications as you are suppose to so you can control you blood sugar after surgery. COUGH AND DEEP BREATHE    Breathing deeply and coughing are very important exercises to do after surgery. Deep breathing and coughing open the little air tubes and air sacks in your lungs.        You take deep breaths every day. You may not even notice - it is just something you do when you sigh or yawn. It is a natural exercise you do to keep these air passages open. After surgery, take deep breaths and cough, on purpose. DIRECTIONS:  · Take 10 to 15 slow deep breaths every hour while awake. · Breathe in deeply, and hold it for 2 seconds. · Exhale slowly through puckered lips, like blowing up a balloon. · After every 4th or 5th deep breath, hug your pillow to your chest or belly and give a hard, deep cough. Yes, it will probably hurt. But doing this exercise is a very important part of healing after surgery. Take your pain medicine to help you do this exercise without too much pain. Coughing and deep breathing help prevent bronchitis and pneumonia after surgery. If you had chest or belly surgery, use a pillow as a \"hug dung\" and hold it tightly to your chest or belly when you cough. ANKLE PUMPS    Ankle pumps increase the circulation of oxygenated blood to your lower extremities and decrease your risk for circulation problems such as blood clots. They also stretch the muscles, tendons and ligaments in your foot and ankle, and prevent joint contracture in the ankle and foot, especially after surgeries on the legs. It is important to do ankle pump exercises regularly after surgery because immobility increases your risk for developing a blood clot. Your doctor may also have you take an Aspirin for the next few days as well. If your doctor did not ask you to take an Aspirin, consult with him before starting Aspirin therapy on your own. The exercise is quite simple. · Slowly point your foot forward, feeling the muscles on the top of your lower leg stretch, and hold this position for 5 seconds. · Next, pull your foot back toward you as far as possible, stretching the calf muscles, and hold that position for 5 seconds.                  · Repeat with the other foot.  · Perform 10 repetitions every hour while awake for both ankles if possible (down and then up with the foot once is one repetition). You should feel gentle stretching of the muscles in your lower leg when doing this exercise. If you feel pain, or your range of motion is limited, don't push too hard. Only go the limit your joint and muscles will let you go. If you have increasing pain, progressively worsening leg warmth or swelling, STOP the exercise and call your doctor. MEDICATION AND   SIDE EFFECT GUIDE    The [facility] MEDICATION AND SIDE EFFECT GUIDE was provided to the PATIENT AND CARE PROVIDER. Information provided includes instruction about drug purpose and common side effects for the following medications:   · ***        These are general instructions for a healthy lifestyle:    *   Please give a list of your current medications to your Primary Care Provider. *   Please update this list whenever your medications are discontinued, doses are changed, or new medications (including over-the-counter products) are added. *   Please carry medication information at all times in case of emergency situations. About Smoking  No smoking / No tobacco products  Avoid exposure to second hand smoke     Surgeon General's Warning:  Quitting smoking now greatly reduces serious risk to your health. Obesity, smoking, and sedentary lifestyle greatly increases your risk for illness and disease. A healthy diet, regular physical exercise & weight monitoring are important for maintaining a healthy lifestyle. Congestive Heart Failure  You may be retaining fluid if you have a history of heart failure or if you experience any of the following symptoms:  Weight gain of 3 pounds or more overnight or 5 pounds in a week, increased swelling in your hands or feet or shortness of breath while lying flat in bed.   Please call your doctor as soon as you notice any of these symptoms; do not wait until your next office visit. Recognize signs and symptoms of STROKE:  F -  Face looks uneven  A -  Arms unable to move or move evenly  S -  Speech slurred or non-existent  T -  Time-call 911 as soon as signs and symptoms begin-DO NOT go          back to bed or wait to see if you get better-TIME IS BRAIN. Warning Signs of HEART ATTACK   Call 911 if you have these symptoms:     Chest discomfort. Most heart attacks involve discomfort in the center of the chest that lasts more than a few minutes, or that goes away and comes back. It can feel like uncomfortable pressure, squeezing, fullness, or pain.  Discomfort in other areas of the upper body. Symptoms can include pain or discomfort in one or both arms, the back, neck, jaw, or stomach.  Shortness of breath with or without chest discomfort.  Other signs may include breaking out in a cold sweat, nausea, or lightheadedness. Don't wait more than five minutes to call 911 - MINUTES MATTER! Fast action can save your life. Calling 911 is almost always the fastest way to get lifesaving treatment. Emergency Medical Services staff can begin treatment when they arrive -- up to an hour sooner than if someone gets to the hospital by car. The discharge information has been reviewed with the {PATIENT PARENT GUARDIAN:[id number]}. Any questions and concerns from the {PATIENT PARENT GUARDIAN:40038} have been addressed. The {PATIENT PARENT GUARDIAN:[id number]} verbalized understanding. Other information in your discharge envelope:  []     PRESCRIPTIONS  []     PHYSICAL THERAPY PRESCRIPTION  []     APPOINTMENT CARDS  []     [facility]ional Anesthesia Pamphlet for block or block with On-Q Catheter from   Anesthesia Service  []     Medical device information sheets/pamphlets from their    []     School/work excuse note. []     /parent work excuse note.         The following personal items collected during your admission are returned to you:   Dental Appliance: Dental Appliances: None  Vision:    Hearing Aid:    Jewelry: Jewelry: None  Clothing: Clothing: Pajamas, Slippers, Undergarments  Other Valuables: Other Valuables: None  Valuables sent to safe:                                             Dr. Tierra HERNANDEZ (J-P) [address] of the Drain    1. Strip the tubing 3 times a day (more often if there are a lot of blood clots). a. Wash hands. b. Grasp the tubing close to the exit point/dressing with one hand and stabilize it.  c. With your other hand grasp the tubing next to your stabilizing hand. d. Using an alcohol pad (or lotion), pinching the tubing tightly, slide your fingers down the tubing away from the body to expel contents of the tubing into the bulb; repeat this 2 or 3 times. It is okay if the tubing becomes flat from the suction. 2. Empty the bulb into a small measuring container 3 times a day or whenever the bulb is full or the bulb feels heavy. a. Wash Hands. b. Open the small plug on the top of the bulb and pour the drainage into the measuring container. c. Squeeze the bulb and hold while replacing the plug. The bulb must be collapsed for the suction and drain to work.  meg. Suly Kohler can pin the tag of the bulb to your clothing so the weight of the bulb does not pull on the insertion site. 3. Measure the drainage and record the amount each time that you empty it.  a. Hold the measuring container at eye level to read the numbers on the side. b. Read the numbers in the ml column and record the time and amount. c. Wash hands                   To empty and measure                         To prime and resume suction    Showering/Dressing Change      1. You MAY shower. 2. You MAY change the dressing. 3. Apply a clean 2x2 or 4x4 gauze around the insertion site after the shower. You may need to change it more often if it becomes heavily soiled.   Call if there is more than a 1 inch area of drainage on your dressing. 4. Call Dr. Cyndee Salazar with the drainage amount on ***. Use the table below to keep a record.       DATE TIME DRAINAGE AMOUNT                                                                                          TORADOL GIVEN @ 09:19AM - NO IBUPROFEN PRODUCTS UNTIL 3:19PM

## 2020-03-17 NOTE — BRIEF OP NOTE
BRIEF OPERATIVE NOTE    Date of Procedure: 3/17/2020   Preoperative Diagnosis: CAPSULAR CONTRACTURE OF BREAST IMPLANTS  Postoperative Diagnosis: CAPSULAR CONTRACTURE OF BREAST IMPLANTS    Procedure(s):  BILATERAL BREAST IMPLANT REMOVAL, BILATERAL BREAST CAPSULECTOMY  Surgeon(s) and Role:     * Brian Reinoso MD - Primary         Surgical Assistant: [address] [place] [address]    Surgical Staff:  Circ-1: Flako Hwang RN  Circ-Relief: Ryanne Kirk RN  Scrub Tech-1: Liuehouse, Dora  Scrub RN-Relief: Luz Lopez RN  Surg Asst-1: Brittany Pagan  Event Time In   Incision Start 9446   Incision Close 0929     Anesthesia: General   Estimated Blood Loss: minimal  Specimens:   ID Type Source Tests Collected by Time Destination   1 : Right Breast Implant and Capsule Preservative Breast  Brian Reinoso MD 3/17/2020 8560 Pathology   2 : Left Breast Implant and Capsule Preservative Breast  Brian Reinoso MD 3/17/2020 0900 Pathology      Findings: ruptured right implant with densely calcified capsule, intact left implant. Small hole in pleura on right, closed and suctioned.  CXR pending  Complications: none  Implants: * No implants in log *

## 2020-03-17 NOTE — ANESTHESIA PREPROCEDURE EVALUATION
Relevant Problems   No relevant active problems       Anesthetic History   No history of anesthetic complications            Review of Systems / Medical History  Patient summary reviewed, nursing notes reviewed and pertinent labs reviewed    Pulmonary    COPD      Smoker         Neuro/Psych   Within defined limits           Cardiovascular  Within defined limits                Exercise tolerance: >4 METS     GI/Hepatic/Renal     GERD           Endo/Other      Hypothyroidism  Arthritis     Other Findings   Comments: EtOH abuse hx  Fibromyalgia           Physical Exam    Airway  Mallampati: II    Neck ROM: normal range of motion   Mouth opening: Normal     Cardiovascular  Regular rate and rhythm,  S1 and S2 normal,  no murmur, click, rub, or gallop  Rhythm: regular  Rate: normal         Dental    Dentition: Edentulous     Pulmonary  Breath sounds clear to auscultation               Abdominal  GI exam deferred       Other Findings            Anesthetic Plan    ASA: 3  Anesthesia type: general          Induction: Intravenous  Anesthetic plan and risks discussed with: Patient

## 2020-03-18 NOTE — OP NOTES
[facility] 79  OPERATIVE REPORT    Name:  Yoselin Plaza  MR#:  [id number]  :  1965  ACCOUNT #:  [de-identified]  DATE OF SERVICE:  2020    PREOPERATIVE DIAGNOSIS:  Bilateral symptomatic breast implants with right capsular contracture and probable rupture. POSTOPERATIVE DIAGNOSIS:  Bilateral symptomatic breast implants with right capsular contracture and probable rupture. PROCEDURE PERFORMED:  Bilateral implant removal and capsulectomy. SURGEON:  ZEB Brambila. ANESTHESIA:  General.    COMPLICATIONS:  None. SPECIMENS REMOVED:  Bilateral implants and bilateral implant capsules. IMPLANTS:  None    ESTIMATED BLOOD LOSS:  Minimal.    INDICATIONS:  The patient is a 63-year-old female with very symptomatic bilateral breast implants who has opted for removal without replacement. PROCEDURE:  After satisfactory induction of general LMA anesthesia, the patient was prepped and draped in sterile fashion. Attention was directed to the right side first where the previous inframammary incision was opened with deepened through the subcutaneous using Bovie cautery. The implant capsule was identified and a small hole was in the implant capsule and there was a free rupture within the capsule of silicone implant. The capsule was excised with a combination of sharp and blunt dissection. The implant had been removed and again was ruptured. All silicone was removed with suction and laparotomy pads. Capsule was completely excised on this side and sent to Pathology. All dissection planes were made hemostatic with the Bovie cautery. The wound and breast were anesthetized with a solution of 0.5% Marcaine, Exparel, and injectable saline of 70 mL of solution. 5 g of Aissatou were then placed into the cavity in the retropectoral space. A #19 round fully-fluted HOLLI drain was also placed and brought out through a separate stab wound.   Muscular layer was closed with interrupted 3-0 Vicryl suture. The subcutaneous tissues were reapproximated with interrupted 3-0 Vicryl and skin closed with running subcuticular 4-0 Monocryl. Attention was directed to the left side where the inframammary incision was opened and deepened through subcutaneous using Bovie cautery. The implant capsule was noted and was excised with a combination of sharp and blunt dissection. The capsule was opened and the implant was removed and noted to be intact. The remaining capsule was removed entirely posteriorly on the chest call. However, there was one area on the intercostal space the pleura was opened as the capsule was incidentally adhered to this area. There was no visceral lung injury noted. An #18-Honduran red rubber catheter was placed in the defect and the defect was closed with interrupted 2-0 Vicryl sutures under positive pressure given by the anesthesiologist.  Catheter was removed, the defect closed, and chest x-ray is pending in the recovery room. All dissection planes were noted to be hemostatic. The wound was anesthetized with the same solution of Marcaine and Exparel with saline. 5 g of Aissatou were placed in the retropectoral space as was a #19 round fully fluted HOLLI drain which was brought out through a separate stab wound. The muscular layer was reapproximated with interrupted 3-0 Vicryl and subcutaneous tissue reapproximated with interrupted 3-0 Vicryl and skin closed with running subcuticular 4-0 Monocryl. The patient tolerated the procedure well without any complications. She was taken to recovery room in stable condition.       Natasha Gold MD [id number]  D:  03/17/2020 10:21  T:  03/17/2020 21:51  JOB #:  [id number]  CC:  Oniel Lawson NP

## 2020-03-20 ENCOUNTER — TELEPHONE (OUTPATIENT)
Dept: SURGERY | Age: 55
End: 2020-03-20

## 2020-03-20 NOTE — TELEPHONE ENCOUNTER
Called to check on patient. No answer on Sonia's phone. She told me earlier she may be resting since she has a . I L/M stating the plan for the weekend, continue NSAIDs, pain pills, ice, Neurontin, ambulating. I L/M that I would follow-up with them next week (this is a Friday). Reminded her to call the office before taking her mom to an ED (we are in the middle of a COVID-19 pandemic).

## 2020-03-20 NOTE — TELEPHONE ENCOUNTER
Spoke with patient's daughter. Patient is no better/no worse than yesterday. Unsure about fever because patient took Tylenol at 7 am.  It is now 9:30 am.   Daughter says patient feels like \"garbage,\" but can't be more specific than that. Recommended following Dr. Bridgette Tom of increasing Neurontin, taking pain pills as needed. Daughter says that she is having a lot of pain and a lot of fatigue. I told her pain pills/Neurontin can both cause increased fatigue. I told her I would check in with them later today. Lesly Carroll was appreciative.

## 2020-03-24 ENCOUNTER — TELEPHONE (OUTPATIENT)
Dept: SURGERY | Age: 55
End: 2020-03-24

## 2020-03-24 NOTE — TELEPHONE ENCOUNTER
Called to check on patient and HOLLI output. Daughter says that her output was about 60 ml yesterday. Has not been measuring them individually, but says that there is an even amount in each. Patient is having a lot of discomfort at both HOLLI sites, but one more than the other. I instructed the daughter to remove all dressings and opsite on the sites and wash them with a washcloth and place a Band-Aid on each so the skin can breathe. I also instructed her how to strip the drain tubing. She already has an appointment for Monday 3/30/2020. I told Jonel Najera to call back sooner if her mom is ready to have the HOLLI drains out if drainage is about 25 ml or less/daily. She understands the above plan and is in agreement.

## 2020-03-30 ENCOUNTER — OFFICE VISIT (OUTPATIENT)
Dept: SURGERY | Age: 55
End: 2020-03-30

## 2020-03-30 VITALS
SYSTOLIC BLOOD PRESSURE: 103 MMHG | HEIGHT: 66 IN | HEART RATE: 80 BPM | BODY MASS INDEX: 22.02 KG/M2 | WEIGHT: 137 LBS | DIASTOLIC BLOOD PRESSURE: 64 MMHG

## 2020-03-30 DIAGNOSIS — T85.44XA CAPSULAR CONTRACTURE OF BREAST IMPLANT, INITIAL ENCOUNTER: Primary | ICD-10-CM

## 2020-03-30 NOTE — PATIENT INSTRUCTIONS

## 2020-03-30 NOTE — PROGRESS NOTES
HISTORY OF PRESENT ILLNESS Hansa Burden is a 47 y.o. female. HPI  ESTABLISHED patient here for post op follow up, s/p BILATERAL Breast implant removal. Has pain to the drain sites. Incisions are dry and intact and healing well. LEFT and Right HOLLI drains - less than 25 cc a day. 3/17/20 - BILATERAL breast implant removal and BILATERAL capsulectomy. ROS Physical Exam 
 
ASSESSMENT and PLAN 
{ASSESSMENT/PLAN:[id number]}

## 2020-03-30 NOTE — PROGRESS NOTES
HISTORY OF PRESENT ILLNESS  Barbara Gregory is a 47 y.o. female. HPI  ESTABLISHED patient here for post op follow up, s/p BILATERAL Breast implant removal. Has pain to the drain sites. Incisions are dry and intact and healing well. LEFT and Right HOLLI drains - less than 25 cc a day.      3/17/20 - BILATERAL breast implant removal and BILATERAL capsulectomy. Past Medical History:   Diagnosis Date    Alcohol abuse     Chronic obstructive pulmonary disease (HCC)     Chronic pain     fibromyalgia    DJD (degenerative joint disease)     Fibromyalgia     GERD (gastroesophageal reflux disease)     Ill-defined condition     hiatel hernia    Liver disease     \"bulging artery to liver diagnosed 1/19\" should have followed up 6 months ago per pt but didnt    Mitral prolapse     Psychiatric disorder 2006    admitted for depression    Thyroid disease     thyroid level needs to be rechecked slightly elevated.  Appt 3/11/20    Tobacco abuse        Past Surgical History:   Procedure Laterality Date    APPENDECTOMY      HX BREAST AUGMENTATION  1991    HX CATARACT REMOVAL      HX GI      stomach ulcers    HX GYN      hysterectomy 20+ years    HX HEENT      tonsillectomy    HX MASTECTOMY Bilateral 3/17/2020    BILATERAL BREAST IMPLANT REMOVAL, BILATERAL BREAST CAPSULECTOMY performed by Tian Ortega MD at [facility] 57 HX ORTHOPAEDIC Right 2010    R shoulder sx    HX ORTHOPAEDIC Right     R 5th toe sx    HX ORTHOPAEDIC Right     R forearm debridement     VAG HYST RADICAL         Social History     Socioeconomic History    Marital status:      Spouse name: Not on file    Number of children: Not on file    Years of education: Not on file    Highest education level: Not on file   Occupational History    Not on file   Social Needs    Financial resource strain: Not on file    Food insecurity     Worry: Not on file     Inability: Not on file    Transportation needs     Medical: Not on file     Non-medical: Not on file   Tobacco Use    Smoking status: Current Every Day Smoker     Packs/day: 1.00     Years: 30.00     Pack years: 30.00     Types: Cigarettes    Smokeless tobacco: Never Used   Substance and Sexual Activity    Alcohol use: Yes     Alcohol/week: 14.0 standard drinks     Types: 14 Shots of liquor per week     Comment: social    Drug use: No    Sexual activity: Not on file   Lifestyle    Physical activity     Days per week: Not on file     Minutes per session: Not on file    Stress: Not on file   Relationships    Social connections     Talks on phone: Not on file     Gets together: Not on file     Attends Jew service: Not on file     Active member of club or organization: Not on file     Attends meetings of clubs or organizations: Not on file     Relationship status: Not on file    Intimate partner violence     Fear of current or ex partner: Not on file     Emotionally abused: Not on file     Physically abused: Not on file     Forced sexual activity: Not on file   Other Topics Concern    Not on file   Social History Narrative    Not on file       Current Outpatient Medications on File Prior to Visit   Medication Sig Dispense Refill    omega 3-dha-epa-fish oil (FISH OIL) 100-160-1,000 mg cap Take 1,000 mg by mouth daily.  gabapentin (NEURONTIN) 100 mg capsule Take 200 mg by mouth daily.  cyclobenzaprine (FLEXERIL) 10 mg tablet Take  by mouth three (3) times daily as needed for Muscle Spasm(s).  prenatal vit/iron fum/folic ac (RIGHT STEP PRENATAL VITAMINS PO) Take  by mouth.  famotidine (PEPCID AC) 20 mg tablet Take 20 mg by mouth two (2) times a day.  cholecalciferol (VITAMIN D3) 1,000 unit tablet Take 2,000 Units by mouth daily.  nitroglycerin (NITROSTAT) 0.4 mg SL tablet 0.4 mg by SubLINGual route every five (5) minutes as needed for Chest Pain. Indications: ANGINA      paroxetine (PAXIL) 10 mg tablet Take 20 mg by mouth nightly. No current facility-administered medications on file prior to visit. Allergies   Allergen Reactions    Codeine Anaphylaxis     Hydrocodone/Oxycodone are okay per patient.  Egg Nausea and Vomiting       OB History    No obstetric history on file. Obstetric Comments   Menarche 15, LMP 12, # of children 2, age of 4st delivery 12, Hysterectomy/oophorectomy Yes/Yes, Breast bx No, history of breast feeding No, BCP No, Hormone therapy No               ROS      Physical Exam  Exam conducted with a chaperone present. Cardiovascular:      Rate and Rhythm: Normal rate and regular rhythm. Heart sounds: Normal heart sounds. Pulmonary:      Breath sounds: Normal breath sounds. Chest:      Breasts: Breasts are symmetrical.         Right: Normal. No swelling, bleeding, inverted nipple, mass, nipple discharge, skin change or tenderness. Left: Normal. No swelling, bleeding, inverted nipple, mass, nipple discharge, skin change or tenderness. Lymphadenopathy:      Cervical:      Right cervical: No superficial, deep or posterior cervical adenopathy. Left cervical: No superficial, deep or posterior cervical adenopathy. Upper Body:      Right upper body: No supraclavicular or axillary adenopathy. Left upper body: No supraclavicular or axillary adenopathy. ASSESSMENT and PLAN    ICD-10-CM ICD-9-CM    1. Capsular contracture of breast implant, initial encounter T85.44XA 611.83       Patient presents for f/u s/p BL implant removal and capsulectomy on 03/17/20, and is doing well overall. Well healed inframammary incisions. Reviewed surgical PATH. BL drains removed by RN without complication. F/U PRN. This plan was reviewed with the patient and patient agrees. All questions were answered.     Written by Oliver Garza, as dictated by Dr. Gracy Espinoza MD.

## 2022-11-28 ENCOUNTER — TRANSCRIBE ORDER (OUTPATIENT)
Dept: SCHEDULING | Age: 57
End: 2022-11-28

## 2022-11-28 DIAGNOSIS — F17.210 CIGARETTE SMOKER: Primary | ICD-10-CM

## 2022-12-06 ENCOUNTER — HOSPITAL ENCOUNTER (OUTPATIENT)
Dept: CT IMAGING | Age: 57
Discharge: HOME | End: 2022-12-06
Attending: NURSE PRACTITIONER
Payer: MEDICARE

## 2022-12-06 VITALS — WEIGHT: 129 LBS | BODY MASS INDEX: 20.73 KG/M2 | HEIGHT: 66 IN

## 2022-12-06 DIAGNOSIS — F17.210 CIGARETTE SMOKER: ICD-10-CM

## 2022-12-06 PROCEDURE — 71271 CT THORAX LUNG CANCER SCR C-: CPT

## 2022-12-28 ENCOUNTER — HOSPITAL ENCOUNTER (EMERGENCY)
Age: 57
Discharge: HOME | End: 2022-12-28
Attending: EMERGENCY MEDICINE
Payer: MEDICARE

## 2022-12-28 ENCOUNTER — APPOINTMENT (OUTPATIENT)
Dept: GENERAL RADIOLOGY | Age: 57
End: 2022-12-28
Attending: EMERGENCY MEDICINE
Payer: MEDICARE

## 2022-12-28 VITALS
DIASTOLIC BLOOD PRESSURE: 90 MMHG | BODY MASS INDEX: 20.28 KG/M2 | SYSTOLIC BLOOD PRESSURE: 123 MMHG | TEMPERATURE: 97.8 F | RESPIRATION RATE: 16 BRPM | HEART RATE: 87 BPM | WEIGHT: 129.19 LBS | OXYGEN SATURATION: 98 % | HEIGHT: 67 IN

## 2022-12-28 DIAGNOSIS — S20.212A CONTUSION OF LEFT CHEST WALL, INITIAL ENCOUNTER: Primary | ICD-10-CM

## 2022-12-28 PROCEDURE — 99283 EMERGENCY DEPT VISIT LOW MDM: CPT

## 2022-12-28 PROCEDURE — 71101 X-RAY EXAM UNILAT RIBS/CHEST: CPT

## 2022-12-28 RX ORDER — METHYLPREDNISOLONE 4 MG/1
TABLET ORAL
Qty: 1 DOSE PACK | Refills: 0 | Status: SHIPPED | OUTPATIENT
Start: 2022-12-28

## 2022-12-28 RX ORDER — ALBUTEROL SULFATE 0.83 MG/ML
SOLUTION RESPIRATORY (INHALATION)
COMMUNITY

## 2022-12-28 RX ORDER — CYCLOBENZAPRINE HCL 5 MG
5 TABLET ORAL
Qty: 12 TABLET | Refills: 0 | Status: SHIPPED | OUTPATIENT
Start: 2022-12-28

## 2022-12-28 NOTE — ED PROVIDER NOTES
14-year-old woman with left rib pain for 2 weeks after she tripped over her grandchild's toy landing on her left side from the ground level. She is not anticoagulated. Denies any other head or losing consciousness. The pain is reproducible at the anterior axillary line around rib #6. She denies any shortness of breath. Vital signs are stable. ROS:  Constitutional: Negative for chills or fever. Eyes: Negative for vision change or loss. ENT and mouth: Negative for ear drainage, epistaxis, or mouth sores. Cardiovascular: Negative for chest pain. Respiratory: No wheezing or shortness of breath. Gastrointestinal: No melena or BRB per rectum. Musculoskeletal: No loss of range of motion. Positive left rib pain status post mechanical ground-level fall. Neurologic: No unilateral weakness. Integumentary: No rash. Psychiatric: Negative for SI.    10 level review of systems is otherwise negative except as noted above in the ROS and in the history of present illness. Reviewed and agree with available nursing notes. Available prior ED visit history reviewed. Vital signs were reviewed and oxygenation is adequate. Physical exam:  Constitutional: Awake, alert, not in severe distress. Head and neck: Normocephalic, atraumatic. No JVD, no nuchal rigidity. ENT and mouth: No active epistaxis, external ears normal, trachea is midline. Eyes: Extraocular movements intact, no periorbital edema. Cardiovascular: Regular rate, regular rhythm. Respiratory: No increased work of breathing, no signs of pending respiratory failure. Gastrointestinal: Nondistended. Musculoskeletal: Free range of motion, no edema. Reproducible rib pain anterior axillary line at rib 6  Integumentary: Warm and dry, no rash, skin is intact. Neurologic: Normal speech, no lateralizing neurologic deficit. Psychiatric: Appropriate affect, not responding to internal stimuli.     Differential diagnosis includes but is not limited to: Fracture, dislocation, sprain, strain, contusion, laceration, abrasion, cellulitis, soft tissue foreign body, tendon injury, arterial occlusion, DVT, head injury, intracranial injury, cervical spine injury, paresthesia, neuropathy. Medical decision making and ED course: X-rays of the ribs were performed, patient's injury is 3weeks old, provided a short course of pain medicines as needed, recommend ice packs to the area, follow-up with her PCP or return to the ER as needed for worsening symptoms. Rib Injury       Past Medical History:   Diagnosis Date    Alcohol abuse     Chronic obstructive pulmonary disease (HCC)     Chronic pain     fibromyalgia    DJD (degenerative joint disease)     Fibromyalgia     GERD (gastroesophageal reflux disease)     Ill-defined condition     hiatel hernia    Liver disease     \"bulging artery to liver diagnosed 1/19\" should have followed up 6 months ago per pt but didnt    Mitral prolapse     Psychiatric disorder 2006    admitted for depression    Thyroid disease     thyroid level needs to be rechecked slightly elevated. Appt 3/11/20    Tobacco abuse        Past Surgical History:   Procedure Laterality Date    HX BREAST AUGMENTATION  1991    HX CATARACT REMOVAL      HX GI      stomach ulcers    HX GYN      hysterectomy 20+ years    HX HEENT      tonsillectomy    HX MASTECTOMY Bilateral 3/17/2020    BILATERAL BREAST IMPLANT REMOVAL, BILATERAL BREAST CAPSULECTOMY performed [facility]jas [facility]    HX ORTHOPAEDIC Right 2010    R shoulder sx    HX ORTHOPAEDIC Right     R 5th toe sx    HX ORTHOPAEDIC Right     R forearm debridement     NE APPENDECTOMY      NE VAG HYST RADICAL           History reviewed. No pertinent family history.     Social History     Socioeconomic History    Marital status:      Spouse name: Not on file    Number of children: Not on file    Years of education: Not on file    Highest education level: Not on file   Occupational History    Not on file   Tobacco Use    Smoking status: Every Day     Packs/day: 1.00     Years: 30.00     Pack years: 30.00     Types: Cigarettes    Smokeless tobacco: Never   Substance and Sexual Activity    Alcohol use:  Yes     Alcohol/week: 14.0 standard drinks     Types: 14 Shots of liquor per week     Comment: social    Drug use: No    Sexual activity: Not on file   Other Topics Concern    Not on file   Social History Narrative    Not on file     Social Determinants of Health     Financial Resource Strain: Not on file   Food Insecurity: Not on file   Transportation Needs: Not on file   Physical Activity: Not on file   Stress: Not on file   Social Connections: Not on file   Intimate Partner Violence: Not on file   Housing Stability: Not on file         ALLERGIES: Codeine, Eggs [egg], and Naproxen    Review of Systems    Vitals:    12/28/22 1423   BP: (!) 123/90   Pulse: 87   Resp: 16   Temp: 97.8 °F (36.6 °C)   SpO2: 98%   Weight: 58.6 kg (129 lb 3 oz)   Height: 5' 6.5\" (1.689 m)            Physical Exam     MDM         Procedures

## 2022-12-28 NOTE — ED TRIAGE NOTES
Pt reports tripping over a child's toy two weeks ago, landed on her left side. Pt reports tripping over a stump and landing on her left side last week. Pt reports continuing pain in her left rib cage.  Pt has been taking Advil

## 2022-12-28 NOTE — ED NOTES
The patient was discharged home by provider in stable condition. The patient is alert and oriented, in no respiratory distress. The patient's diagnosis, condition and treatment were explained. The patient expressed understanding. Two prescriptions given. Work/school note given. A discharge plan has been developed. A  was not involved in the process. Aftercare instructions were given. Pt ambulatory out of the ED.

## 2022-12-28 NOTE — Clinical Note
[address] EMERGENCY DEPT  [address] [facility] [address]  [phone]    Work/School Note    Date: 12/28/2022    To Whom It May concern:    Carmen Goel was seen and treated today in the emergency room by the following provider(s):  Attending Provider: Juana Ross MD.      Carmen Goel is excused from work/school on 12/28/22 and 12/29/22. She is medically clear to return to work/school on 12/30/2022.        Sincerely,          Jessica Tyler MD

## 2024-06-05 ENCOUNTER — HOSPITAL ENCOUNTER (EMERGENCY)
Facility: HOSPITAL | Age: 59
Discharge: HOME | End: 2024-06-05
Attending: EMERGENCY MEDICINE
Payer: MEDICARE

## 2024-06-05 VITALS
HEIGHT: 66 IN | TEMPERATURE: 97.5 F | WEIGHT: 129.19 LBS | DIASTOLIC BLOOD PRESSURE: 64 MMHG | BODY MASS INDEX: 20.76 KG/M2 | SYSTOLIC BLOOD PRESSURE: 128 MMHG | RESPIRATION RATE: 18 BRPM | OXYGEN SATURATION: 98 % | HEART RATE: 98 BPM

## 2024-06-05 DIAGNOSIS — A69.20 LYME DISEASE: Primary | ICD-10-CM

## 2024-06-05 LAB
ANION GAP SERPL CALC-SCNC: 8 MMOL/L (ref 5–15)
BASOPHILS # BLD: 0 K/UL (ref 0–0.1)
BASOPHILS NFR BLD: 1 % (ref 0–1)
BUN SERPL-MCNC: 11 MG/DL (ref 6–20)
BUN/CREAT SERPL: 18 (ref 12–20)
CALCIUM SERPL-MCNC: 9.7 MG/DL (ref 8.5–10.1)
CHLORIDE SERPL-SCNC: 105 MMOL/L (ref 97–108)
CO2 SERPL-SCNC: 26 MMOL/L (ref 21–32)
CREAT SERPL-MCNC: 0.62 MG/DL (ref 0.55–1.02)
DIFFERENTIAL METHOD BLD: ABNORMAL
EOSINOPHIL # BLD: 0.4 K/UL (ref 0–0.4)
EOSINOPHIL NFR BLD: 8 % (ref 0–7)
ERYTHROCYTE [DISTWIDTH] IN BLOOD BY AUTOMATED COUNT: 13.5 % (ref 11.5–14.5)
GLUCOSE SERPL-MCNC: 108 MG/DL (ref 65–100)
HCT VFR BLD AUTO: 38.1 % (ref 35–47)
HGB BLD-MCNC: 13.2 G/DL (ref 11.5–16)
IMM GRANULOCYTES # BLD AUTO: 0 K/UL (ref 0–0.04)
IMM GRANULOCYTES NFR BLD AUTO: 1 % (ref 0–0.5)
LYMPHOCYTES # BLD: 2 K/UL (ref 0.8–3.5)
LYMPHOCYTES NFR BLD: 37 % (ref 12–49)
MCH RBC QN AUTO: 33.4 PG (ref 26–34)
MCHC RBC AUTO-ENTMCNC: 34.6 G/DL (ref 30–36.5)
MCV RBC AUTO: 96.5 FL (ref 80–99)
MONOCYTES # BLD: 0.6 K/UL (ref 0–1)
MONOCYTES NFR BLD: 11 % (ref 5–13)
NEUTS SEG # BLD: 2.2 K/UL (ref 1.8–8)
NEUTS SEG NFR BLD: 42 % (ref 32–75)
NRBC # BLD: 0 K/UL (ref 0–0.01)
NRBC BLD-RTO: 0 PER 100 WBC
PLATELET # BLD AUTO: 214 K/UL (ref 150–400)
PMV BLD AUTO: 8.6 FL (ref 8.9–12.9)
POTASSIUM SERPL-SCNC: 3.2 MMOL/L (ref 3.5–5.1)
RBC # BLD AUTO: 3.95 M/UL (ref 3.8–5.2)
SODIUM SERPL-SCNC: 139 MMOL/L (ref 136–145)
WBC # BLD AUTO: 5.2 K/UL (ref 3.6–11)

## 2024-06-05 PROCEDURE — 36415 COLL VENOUS BLD VENIPUNCTURE: CPT

## 2024-06-05 PROCEDURE — 99284 EMERGENCY DEPT VISIT MOD MDM: CPT

## 2024-06-05 PROCEDURE — 85025 COMPLETE CBC W/AUTO DIFF WBC: CPT

## 2024-06-05 PROCEDURE — 6370000000 HC RX 637 (ALT 250 FOR IP): Performed by: EMERGENCY MEDICINE

## 2024-06-05 PROCEDURE — 80048 BASIC METABOLIC PNL TOTAL CA: CPT

## 2024-06-05 PROCEDURE — 93005 ELECTROCARDIOGRAM TRACING: CPT | Performed by: EMERGENCY MEDICINE

## 2024-06-05 PROCEDURE — 87476 LYME DIS DNA AMP PROBE: CPT

## 2024-06-05 RX ORDER — DOXYCYCLINE HYCLATE 100 MG
100 TABLET ORAL 2 TIMES DAILY
Qty: 56 TABLET | Refills: 0 | Status: SHIPPED | OUTPATIENT
Start: 2024-06-05 | End: 2024-07-03

## 2024-06-05 RX ORDER — DOXYCYCLINE HYCLATE 100 MG
100 TABLET ORAL
Status: COMPLETED | OUTPATIENT
Start: 2024-06-05 | End: 2024-06-05

## 2024-06-05 RX ADMIN — DOXYCYCLINE HYCLATE 100 MG: 100 TABLET, COATED ORAL at 00:53

## 2024-06-05 ASSESSMENT — PAIN - FUNCTIONAL ASSESSMENT: PAIN_FUNCTIONAL_ASSESSMENT: NONE - DENIES PAIN

## 2024-06-05 NOTE — ED TRIAGE NOTES
Patient ambulatory to Triage with c/o redness on chest and abdomen after being bitten by a tick about a month ago    Patient reports chills and sweats during the night, states that she is unsure if she has been running a fever    Patient has a hx of Emphysema

## 2024-06-05 NOTE — ED NOTES
I reviewed the discharge papers with patient and she verbalized understanding. IV removed w/o complications. Patient left in good/stable condition

## 2024-06-05 NOTE — ED PROVIDER NOTES
Height:           Body mass index is 20.85 kg/m².    Physical Exam  Vitals and nursing note reviewed.   Constitutional:       General: She is not in acute distress.     Appearance: Normal appearance.   HENT:      Head: Normocephalic.      Mouth/Throat:      Pharynx: Oropharynx is clear.   Eyes:      Extraocular Movements: Extraocular movements intact.      Conjunctiva/sclera: Conjunctivae normal.      Pupils: Pupils are equal, round, and reactive to light.   Cardiovascular:      Rate and Rhythm: Normal rate.      Pulses: Normal pulses.      Heart sounds: No murmur heard.  Pulmonary:      Effort: Pulmonary effort is normal. No respiratory distress.   Abdominal:      General: Abdomen is flat.      Palpations: Abdomen is soft.      Tenderness: There is no abdominal tenderness.   Musculoskeletal:         General: No swelling. Normal range of motion.      Cervical back: Neck supple.   Skin:     General: Skin is warm.      Capillary Refill: Capillary refill takes less than 2 seconds.      Findings: Rash present.          Neurological:      General: No focal deficit present.      Mental Status: She is alert and oriented to person, place, and time.      Cranial Nerves: No cranial nerve deficit.      Sensory: No sensory deficit.      Motor: No weakness.       DIAGNOSTIC RESULTS     RADIOLOGY:   Interpretation per the Radiologist below, if available at the time of this note:    No orders to display        LABS:    Results for orders placed or performed during the hospital encounter of 06/05/24   CBC with Auto Differential   Result Value Ref Range    WBC 5.2 3.6 - 11.0 K/uL    RBC 3.95 3.80 - 5.20 M/uL    Hemoglobin 13.2 11.5 - 16.0 g/dL    Hematocrit 38.1 35.0 - 47.0 %    MCV 96.5 80.0 - 99.0 FL    MCH 33.4 26.0 - 34.0 PG    MCHC 34.6 30.0 - 36.5 g/dL    RDW 13.5 11.5 - 14.5 %    Platelets 214 150 - 400 K/uL    MPV 8.6 (L) 8.9 - 12.9 FL    Nucleated RBCs 0.0 0  WBC    nRBC 0.00 0.00 - 0.01 K/uL    Neutrophils % 42 32

## 2024-06-06 LAB
EKG ATRIAL RATE: 84 BPM
EKG DIAGNOSIS: NORMAL
EKG P AXIS: 80 DEGREES
EKG P-R INTERVAL: 158 MS
EKG Q-T INTERVAL: 388 MS
EKG QRS DURATION: 76 MS
EKG QTC CALCULATION (BAZETT): 458 MS
EKG R AXIS: 64 DEGREES
EKG T AXIS: 68 DEGREES
EKG VENTRICULAR RATE: 84 BPM

## 2024-06-07 LAB
B BURGDOR DNA SPEC QL NAA+PROBE: NEGATIVE
SPECIMEN SOURCE: NORMAL

## (undated) DEVICE — TUBING, SUCTION, 1/4" X 10', STRAIGHT: Brand: MEDLINE

## (undated) DEVICE — SUTURE MCRYL SZ 4-0 L27IN ABSRB UD L19MM PS-2 1/2 CIR PRIM Y426H

## (undated) DEVICE — SMOKE EVACUATION PENCIL: Brand: VALLEYLAB

## (undated) DEVICE — AGENT HEMSTAT 5GM ARISTA AH

## (undated) DEVICE — 3M™ TEGADERM™ TRANSPARENT FILM DRESSING FRAME STYLE, 1626W, 4 IN X 4-3/4 IN (10 CM X 12 CM), 50/CT 4CT/CASE: Brand: 3M™ TEGADERM™

## (undated) DEVICE — CATHETER URETH 18FR RED RUB INTMIT ALL PURP

## (undated) DEVICE — SUT SLK 2-0SH 30IN BLK --

## (undated) DEVICE — CANISTER, RIGID, 3000CC: Brand: MEDLINE INDUSTRIES, INC.

## (undated) DEVICE — COVER LT HNDL PLAS RIG 1 PER PK

## (undated) DEVICE — INFECTION CONTROL KIT SYS

## (undated) DEVICE — DRAIN SURG 19FR 100% SIL RADPQ RND CHN FULL FLUT

## (undated) DEVICE — APPLIER CLP M L11IN TI MULT RNG HNDL 30 CLP STR LIGACLP

## (undated) DEVICE — STERILE POLYISOPRENE POWDER-FREE SURGICAL GLOVES: Brand: PROTEXIS

## (undated) DEVICE — SUTURE VCRL SZ 2-0 L27IN ABSRB UD L26MM SH 1/2 CIR J417H

## (undated) DEVICE — RESERVOIR,SUCTION,100CC,SILICONE: Brand: MEDLINE

## (undated) DEVICE — AEGIS 1" DISK 4MM HOLE, PEEL OPEN: Brand: MEDLINE

## (undated) DEVICE — STRAP,POSITIONING,KNEE/BODY,FOAM,4X60": Brand: MEDLINE

## (undated) DEVICE — BANDAGE COBAN 4 IN COMPR W4INXL5YD FOAM COHESIVE QUIK STK SELF ADH SFT

## (undated) DEVICE — CHEST PACK: Brand: MEDLINE INDUSTRIES, INC.

## (undated) DEVICE — INSULATED BLADE ELECTRODE: Brand: EDGE

## (undated) DEVICE — SOLUTION IV 1000ML 0.9% SOD CHL

## (undated) DEVICE — NEEDLE HYPO 22GA L1.5IN BLK S STL HUB POLYPR SHLD REG BVL